# Patient Record
Sex: FEMALE | Race: WHITE | URBAN - METROPOLITAN AREA
[De-identification: names, ages, dates, MRNs, and addresses within clinical notes are randomized per-mention and may not be internally consistent; named-entity substitution may affect disease eponyms.]

---
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
## 2018-01-10 ENCOUNTER — TELEPHONE (OUTPATIENT)
Dept: OBGYN CLINIC | Age: 18
End: 2018-01-10

## 2018-01-10 RX ORDER — AZITHROMYCIN 250 MG/1
TABLET, FILM COATED ORAL
Qty: 4 TAB | Refills: 0 | Status: SHIPPED | OUTPATIENT
Start: 2018-01-10 | End: 2018-01-15

## 2018-01-10 NOTE — TELEPHONE ENCOUNTER
HIPPA verified to speak to  Wilma Samaniego , mother regarding her daughter. Mother advised of MD recommendations and prescription sent as per MD order to patient preferred pharmacy.      Mother verbalized understanding

## 2018-01-10 NOTE — TELEPHONE ENCOUNTER
Ok to repeat today 250 mg 4 tabs po now. Partner also needs treatment before they are intimate again or she will get reinfected.

## 2018-01-10 NOTE — TELEPHONE ENCOUNTER
Call received at 1:32PM      MIGUEL verified to speak to [facility] Perry(mother) , regarding her daughter. 21year old patient last seen in the office on 12/5/18. Mother calling to say that her daughter finally took the medication ( Zithromax) for the positive chlamydia last night at 8:15pm and at 11:50 she got sick and threw up. The mother is concerned that she may not have received the complete dose. ? Refill , If so the mother would like the pills and not the powder since the pharmacy could not get the powder.         Please advise

## 2018-01-10 NOTE — TELEPHONE ENCOUNTER
This nurse attempted to reach the mother at the provided phone number. This nurse unable to leave a message due to voice mail box to full to leave a message. This nurse will try again.

## 2018-02-20 ENCOUNTER — OFFICE VISIT (OUTPATIENT)
Dept: OBGYN CLINIC | Age: 18
End: 2018-02-20

## 2018-02-20 DIAGNOSIS — Z20.2 EXPOSURE TO STD: Primary | ICD-10-CM

## 2018-02-20 DIAGNOSIS — N93.9 ABNORMAL UTERINE BLEEDING: ICD-10-CM

## 2018-02-20 LAB
HCG URINE, QL. (POC): NEGATIVE
VALID INTERNAL CONTROL?: YES

## 2018-02-20 NOTE — PROGRESS NOTES
Chief Complaint   Follow-up      HPI  Monse Maloney is a 21 y.o. female who presents for the evaluation of possible STI. No LMP recorded. She is here for a test of cure for +chlamydia from her nuab dated for 11/27/17. She has taken the treatment that was prescribed. She denies symptoms at this time. The patient  has risk factors for sexually-transmitted infection. Past Medical History:   Diagnosis Date    Anxiety     Bipolar 1 disorder ([address]     Chlamydia 11/2017    Depression     Fibromyalgia      No past surgical history on file. Social History     Occupational History    Not on file. Social History Main Topics    Smoking status: Never Smoker    Smokeless tobacco: Never Used    Alcohol use No    Drug use: Not on file    Sexual activity: Yes     Partners: Male     Birth control/ protection: Condom     Family History   Problem Relation Age of Onset    No Known Problems Mother        No Known Allergies  Prior to Admission medications    Medication Sig Start Date End Date Taking? Authorizing Provider   DULoxetine (CYMBALTA) 60 mg capsule Take 60 mg by mouth daily. Yes Historical Provider   drospirenone-ethinyl estradiol (ASTRID) 3-0.02 mg tab Take 1 Tab by mouth daily. 11/27/17  Yes Xiomara Schwarz MD        Review of Systems: History obtained from the patient  Constitutional: negative for weight loss, fever, night sweats  Breast: negative for breast lumps, nipple discharge, galactorrhea  GI: negative for change in bowel habits, abdominal pain, black or bloody stools  : negative for frequency, dysuria, hematuria, vaginal discharge  MSK: negative for back pain, joint pain, muscle pain  Skin: negative for itching, rash, hives  Psych: negative for anxiety, depression, change in mood    Objective: There were no vitals taken for this visit.     PHYSICAL EXAMINATION    Constitutional  · Appearance: well-nourished, well developed, alert, in no acute distress    Gastrointestinal  · Abdominal Examination: abdomen non-tender to palpation, normal bowel sounds, no masses present  · Liver and spleen: no hepatomegaly present, spleen not palpable  · Hernias: no hernias identified    Skin  · General Inspection: no rash, no lesions identified    Neurologic/Psychiatric  · Mental Status:  · Orientation: grossly oriented to person, place and time  · Mood and Affect: mood normal, affect appropriate    Assesment/Plan:   H/o chlamydia, repeat testing today  Missed 2 OCP pills, rec repeat upt in 1 month  We discussed STI issues including treatment options, the necessity of treating partners and prevention of transmission. ROV prn if symptoms persist or worsen.

## 2018-02-22 LAB
C TRACH RRNA SPEC QL NAA+PROBE: NEGATIVE
N GONORRHOEA RRNA SPEC QL NAA+PROBE: NEGATIVE
T VAGINALIS RRNA SPEC QL NAA+PROBE: NEGATIVE

## 2018-03-23 ENCOUNTER — TELEPHONE (OUTPATIENT)
Dept: OBGYN CLINIC | Age: 18
End: 2018-03-23

## 2018-03-23 NOTE — TELEPHONE ENCOUNTER
Patient's mother, Joanna Hameed, who is on PHI form and allowed to receive all info. , is calling in regards to recent vaginal culture, advised it was negative she voiced understanding. And, to schedule an appt for her annual and to follow up for a UPT d/t missed OCPs (per 2/20/18 note).  Appt set for Wed., 4/11/18 at 3:00pm.

## 2018-08-27 ENCOUNTER — ED HISTORICAL/CONVERTED ENCOUNTER (OUTPATIENT)
Dept: OTHER | Age: 18
End: 2018-08-27